# Patient Record
Sex: MALE | Race: WHITE
[De-identification: names, ages, dates, MRNs, and addresses within clinical notes are randomized per-mention and may not be internally consistent; named-entity substitution may affect disease eponyms.]

---

## 2019-02-15 ENCOUNTER — HOSPITAL ENCOUNTER (EMERGENCY)
Dept: HOSPITAL 41 - JD.ED | Age: 49
Discharge: HOME | End: 2019-02-15
Payer: COMMERCIAL

## 2019-02-15 DIAGNOSIS — Z79.82: ICD-10-CM

## 2019-02-15 DIAGNOSIS — K52.9: Primary | ICD-10-CM

## 2019-02-15 PROCEDURE — 86645 CMV ANTIBODY IGM: CPT

## 2019-02-15 PROCEDURE — 96375 TX/PRO/DX INJ NEW DRUG ADDON: CPT

## 2019-02-15 PROCEDURE — 99285 EMERGENCY DEPT VISIT HI MDM: CPT

## 2019-02-15 PROCEDURE — 87804 INFLUENZA ASSAY W/OPTIC: CPT

## 2019-02-15 PROCEDURE — 87040 BLOOD CULTURE FOR BACTERIA: CPT

## 2019-02-15 PROCEDURE — 81001 URINALYSIS AUTO W/SCOPE: CPT

## 2019-02-15 PROCEDURE — 86644 CMV ANTIBODY: CPT

## 2019-02-15 PROCEDURE — 85027 COMPLETE CBC AUTOMATED: CPT

## 2019-02-15 PROCEDURE — 96361 HYDRATE IV INFUSION ADD-ON: CPT

## 2019-02-15 PROCEDURE — 80053 COMPREHEN METABOLIC PANEL: CPT

## 2019-02-15 PROCEDURE — 83690 ASSAY OF LIPASE: CPT

## 2019-02-15 PROCEDURE — 36415 COLL VENOUS BLD VENIPUNCTURE: CPT

## 2019-02-15 PROCEDURE — 74177 CT ABD & PELVIS W/CONTRAST: CPT

## 2019-02-15 PROCEDURE — 96365 THER/PROPH/DIAG IV INF INIT: CPT

## 2019-02-15 PROCEDURE — 96376 TX/PRO/DX INJ SAME DRUG ADON: CPT

## 2019-02-15 PROCEDURE — 85007 BL SMEAR W/DIFF WBC COUNT: CPT

## 2019-02-15 NOTE — EDM.PDOC
<Kelton Lees - Last Filed: 02/15/19 18:03>





ED HPI GENERAL MEDICAL PROBLEM





- General


Chief Complaint: Abdominal Pain


Stated Complaint: [facility]


Time Seen by Provider: 02/15/19 03:01


Source of Information: Reports: Patient, RN Notes Reviewed


History Limitations: Reports: No Limitations





- History of Present Illness


INITIAL COMMENTS - FREE TEXT/NARRATIVE: 





The patient states that he developed generalized abdominal pain, nausea, 

vomiting, and watery diarrhea around 21:00 last night, Thursday, 2/14/2019. He 

describes his abdominal pain as crampy and stabbing in character. It is 

constant. He has not identified any modifiers. He reports that he had a 

subjective fever earlier yesterday, and some dysuria earlier last night. He 

does not recall eating any bad or spoiled food recently. None of his contacts 

at home or work are similarly ill, to the patient's knowledge. The recent 

antibiotics. No recent international travel.





The patient states that he has had similar symptoms the past, but cannot 

remember when - it was likely years ago.





No recent cough.





The patient states that he did not take any over-the-counter home remedies to 

try to treat his symptoms.





His last oral solid food was around 19:00 last night.





The patient states that he is status post a liver transplant, for NAFLD, in May 

2018, at [facility], in [place], [place]. He is on tacrolimus and 

CellCept.





The patient lives in [place], [place], which is where his PCP is. 

He is in this area for work. He works for 14 days, then returns home for 7 days

, then repeats.








  ** Abdomen


Pain Score (Numeric/FACES): 8





- Related Data


 Allergies











Allergy/AdvReac Type Severity Reaction Status Date / Time


 


No Known Allergies Allergy   Verified 02/15/19 02:54











Home Meds: 


 Home Meds





Aspirin [Halfprin] 81 mg PO DAILY 02/15/19 [History]


Calcium Carb & Citrate/Vit D3 [Citracal + D ER] 1 tab PO BID 02/15/19 [History]


Ciprofloxacin HCl [Cipro] 500 mg PO BID #12 tablet 02/15/19 [Rx]


Magnesium Oxide [Magnesium] 400 mg PO BID 02/15/19 [History]


Metoclopramide HCl [Reglan] 10 mg PO Q6H #10 tablet 02/15/19 [Rx]


Mycophenolate Mofetil [Cellcept] 750 mg PO BID 02/15/19 [History]


Ondansetron [Zofran] 4 mg BUCCAL Q6H PRN #10 tab 02/15/19 [Rx]


Tacrolimus 1 g PO BID 02/15/19 [History]


oxyCODONE HCl [Roxicodone] 1 - 2 tab PO Q4H PRN #20 tablet 02/15/19 [Rx]











Past Medical History


Gastrointestinal History: Reports: Other (See Below) (Nonalcoholic fatty liver 

disease (NAFLD))





- Past Surgical History


GI Surgical History: Reports: Appendectomy, Hernia, Inguinal (bilateral), Other 

(See Below) (Liver transplant, May 2018, at [facility])





Social & Family History





- Family History


Family Medical History: Noncontributory





- Tobacco Use


Smoking Status *Q: Never Smoker


Tobacco Use Within Last Twelve Months: Cigars (on occasion)





- Alcohol Use


Alcohol Use History: No





- Recreational Drug Use


Recreational Drug Use: No





- Living Situation & Occupation


Living situation: Reports: , with Spouse, with Family (2 kids)


Occupation: Employed ()





ED ROS GENERAL





- Review of Systems


Review Of Systems: ROS reveals no pertinent complaints other than HPI.





ED EXAM, GI/ABD





- Physical Exam


Exam: See Below


Exam Limited By: No Limitations


General Appearance: Alert, WD/WN, Mild Distress (appears uncomfortable)


Eyes: Bilateral: Normal Appearance, EOMI


Ears: Normal External Exam, Hearing Grossly Normal


Nose: Normal Inspection


Throat/Mouth: Normal Inspection, Normal Lips, Normal Voice, No Airway Compromise


Head: Atraumatic, Normocephalic


Neck: Normal Inspection, Full Range of Motion


Respiratory/Chest: No Respiratory Distress, Lungs Clear, Normal Breath Sounds, 

No Accessory Muscle Use


Cardiovascular: Normal Peripheral Pulses, Regular Rate, Rhythm, No Edema, No 

Gallop, No JVD, No Murmur, No Rub


GI/Abdominal Exam: Soft, No Organomegaly, No Distention, No Abnormal Bruit, No 

Mass, Tender (generalized, non-focal), Abnormal Bowel Sounds (diminished), 

Other (Obese)


 (Male) Exam: Deferred


Rectal (Males) Exam: Deferred


Back Exam: Normal Inspection, Full Range of Motion, CVA Tenderness (L).  No: 

CVA Tenderness (R)


Extremities: Normal Inspection, Normal Range of Motion, No Pedal Edema, Normal 

Capillary Refill


Neurological: Alert, Oriented, Normal Cognition, No Motor/Sensory Deficits


Psychiatric: Normal Affect


Skin Exam: Warm, Dry, Intact, Normal Color, No Rash





Course





- Vital Signs


Last Recorded V/S: 


 Last Vital Signs











Temp  36.5 C   02/15/19 02:56


 


Pulse  80   02/15/19 02:56


 


Resp  18   02/15/19 02:56


 


BP  141/88 H  02/15/19 02:56


 


Pulse Ox  99   02/15/19 02:56














- Orders/Labs/Meds


Orders: 


 Active Orders 24 hr











 Category Date Time Status


 


 CULTURE BLOOD [BC] Stat Lab  02/15/19 06:45 Results


 


 CULTURE BLOOD [BC] Stat Lab  02/15/19 07:00 Results











Labs: 


 Laboratory Tests











  02/15/19 02/15/19 02/15/19 Range/Units





  03:22 03:22 04:59 


 


WBC  5.50    (4.23-9.07)  K/mm3


 


RBC  5.94    (4.63-6.08)  M/mm3


 


Hgb  16.7    (13.7-17.5)  gm/L


 


Hct  48.7    (40.1-51.0)  %


 


MCV  82.0    (79.0-92.2)  fl


 


MCH  28.1    (25.7-32.2)  pg


 


MCHC  34.3    (32.2-35.5)  g/dl


 


RDW Std Deviation  49.0 H    (35.1-43.9)  fL


 


Plt Count  95 L    (163-337)  K/mm3


 


MPV  10.3    (9.4-12.3)  fl


 


Neutrophils % (Manual)  75 H    (40-60)  %


 


Band Neutrophils %  13 H    (0-10)  %


 


Lymphocytes % (Manual)  4 L    (20-40)  %


 


Atypical Lymphs %  0    %


 


Monocytes % (Manual)  7    (2-10)  %


 


Eosinophils % (Manual)  1    (0.8-7.0)  %


 


Basophils % (Manual)  0 L    (0.2-1.2)  


 


Toxic Granulation  1+ slight    


 


Platelet Estimate  Decreased    


 


Plt Morphology Comment  Normal    


 


RBC Morph Comment  Normal    


 


Sodium   141   (136-145)  mEq/L


 


Potassium   3.9   (3.5-5.1)  mEq/L


 


Chloride   105   ()  mEq/L


 


Carbon Dioxide   26   (21-32)  mEq/L


 


Anion Gap   13.9   (5-15)  


 


BUN   25 H   (7-18)  mg/dL


 


Creatinine   1.3   (0.7-1.3)  mg/dL


 


Est Cr Clr Drug Dosing   TNP   


 


Estimated GFR (MDRD)   58   (>60)  mL/min


 


BUN/Creatinine Ratio   19.2 H   (14-18)  


 


Glucose   151 H   ()  mg/dL


 


Calcium   8.8   (8.5-10.1)  mg/dL


 


Total Bilirubin   1.7 H   (0.2-1.0)  mg/dL


 


AST   47 H   (15-37)  U/L


 


ALT   66 H   (16-63)  U/L


 


Alkaline Phosphatase   65   ()  U/L


 


Total Protein   7.0   (6.4-8.2)  g/dl


 


Albumin   3.9   (3.4-5.0)  g/dl


 


Globulin   3.1   gm/dL


 


Albumin/Globulin Ratio   1.3   (1-2)  


 


Lipase   94   ()  U/L


 


Urine Color    Rosamaria H  (Yellow)  


 


Urine Appearance    Slt cloudy H  (Clear)  


 


Urine pH    7.5  (5.0-8.0)  


 


Ur Specific Gravity    1.020  (1.005-1.030)  


 


Urine Protein    1+ H  (Negative)  


 


Urine Glucose (UA)    Negative  (Negative)  


 


Urine Ketones    Negative  (Negative)  


 


Urine Occult Blood    Trace-intact H  (Negative)  


 


Urine Nitrite    Negative  (Negative)  


 


Urine Bilirubin    1+ H  (Negative)  


 


Urine Urobilinogen    0.2  (0.2-1.0)  


 


Ur Leukocyte Esterase    Negative  (Negative)  


 


Urine RBC    0-5  (0-5)  /hpf


 


Urine WBC    Not seen  (0-5)  /hpf


 


Ur Epithelial Cells    0-5  (0-5)  /hpf


 


Urine Bacteria    Few  (FEW)  /hpf


 


Hyaline Casts    0-5  (0-5)  /lpf


 


Urine Mucus    Many H  (FEW)  /hpf











Meds: 


Medications














Discontinued Medications














Generic Name Dose Route Start Last Admin





  Trade Name Freq  PRN Reason Stop Dose Admin


 


Hydromorphone HCl  1 mg  02/15/19 03:15  02/15/19 03:26





  Dilaudid  IVPUSH  02/15/19 03:16  1 mg





  ONETIME STA   Administration





     





     





     





     


 


Hydromorphone HCl  1 mg  02/15/19 06:44  02/15/19 06:51





  Dilaudid  IVPUSH  02/15/19 06:45  1 mg





  ONETIME ONE   Administration





     





     





     





     


 


Sodium Chloride  1,000 mls @ 150 mls/hr  02/15/19 03:15  02/15/19 03:26





  Normal Saline  IV   150 mls/hr





  ASDIRECTED MOOSE   Administration





     





     





     





     


 


Piperacillin Sod/Tazobactam  100 mls @ 200 mls/hr  02/15/19 08:00  02/15/19 08:

15





  Sod 4.5 gm/ Sodium Chloride  IV   200 mls/hr





  Q6H MOOSE   Administration





     





     





     





     


 


Sodium Chloride  Confirm  02/15/19 08:09  02/15/19 08:16





  Normal Saline  Administered  02/15/19 08:10  Not Given





  Dose   





  100 mls @ as directed   





  .ROUTE   





  .STK-MED ONE   





     





     





     





     


 


Metoclopramide HCl  10 mg  02/15/19 05:30  02/15/19 08:16





  Reglan  IVPUSH  02/15/19 05:31  Not Given





  ONETIME STA   





     





     





     





     


 


Metoclopramide HCl  Confirm  02/15/19 05:32  02/15/19 05:54





  Reglan  Administered  02/15/19 05:33  Not Given





  Dose   





  10 mg   





  .ROUTE   





  .STK-MED ONE   





     





     





     





     


 


Ondansetron HCl  4 mg  02/15/19 03:15  02/15/19 03:26





  Zofran  IVPUSH  02/15/19 03:16  4 mg





  ONETIME ONE   Administration





     





     





     





     


 


Ondansetron HCl  Confirm  02/15/19 05:31  02/15/19 05:54





  Zofran  Administered  02/15/19 05:32  Not Given





  Dose   





  4 mg   





  .ROUTE   





  .STK-MED ONE   





     





     





     





     














- Re-Assessments/Exams


Free Text/Narrative Re-Assessment/Exam: 





02/15/19 03:25


The cause of the patient's generalized abdominal pain, nausea, vomiting, and 

watery diarrhea is not immediately clear. He has considerable non-focal 

tenderness, and diminished bowel sounds. In addition, he has some left CVA 

tenderness and a history of dysuria earlier tonight. I have ordered a workup 

that included blood work, a urinalysis, and a CT scan of his abdomen and pelvis 

with oral and IV contrast. In the meantime, the patient will receive IV Dilaudid

, IV Zofran, and IV fluid.








02/15/19 05:54


CT of the abdomen and pelvis with oral and IV contrast is read by [facility] as:


1. Status post liver transplant surgery


2. Normal appearing hepatic artery, portal vein as well as biliary anastomosis


3. Mild michael hepatis edema is noted. The significance is unclear








02/15/19 06:10


Case discussed with Dr. Sarah Sánchez at 05:58. Since the patient's liver 

transplant was less than one year ago, she is concerned that his symptoms may 

represent early rejection.





Case then discussed with a representative at the [facility] 

liver transplant facility at 06:00. She will have the Hepatologist on-call, Dr. Mayes, call us back as soon as possible.





The above was discussed with the patient. Dr. Mayes is his Hepatologist. He is 

prepared to transfer back to [place], if that is determined what is 

necessary.








02/15/19 06:20


Case discussed with Dr. Mayes, Hepatologist at [facility], 

at 06:13. She is concerned that the patient may have CMV infection, which would 

account for his report of fever and watery diarrhea. She recommended that we 

send for a blood quantitative CMV by PCR. She is aware that it will probably 

take 48 hours to get the results. She would also like a stool C. difficile by 

PCR, and blood cultures. She would like us to keep the patient well hydrated. 

She is going to check with the patient's coordinator, to see if a recent 

evaluation for CMV is positive or negative, and will plan to call us back in 

about one hour. She is concerned, based on the patient's presentation, that he 

will need to be transferred back to their facility, but will make that 

determination when she calls us back. I notified her that we will have had a 

change of shift by the time she calls back, and that she will be discussing the 

case with Dr. Gamble.








02/15/19 06:23


I checked with the lab. A CMV test sent today will not likely return until 

Monday, 2/18/2019.





The patient has not been able to provide a stool sample for us thus far.








02/15/19 07:00


Case discussed with Dr. Cesario Gamble, and care of the patient turned over to 

him at this time, for change of shift.





Departure





- Departure


Time of Disposition: 09:00


Disposition: Home, Self-Care 01


Clinical Impression: 


 Enteritis








- Discharge Information


Prescriptions: 


Ciprofloxacin HCl [Cipro] 500 mg PO BID #12 tablet


Metoclopramide HCl [Reglan] 10 mg PO Q6H #10 tablet


Ondansetron [Zofran] 4 mg BUCCAL Q6H PRN #10 tab


 PRN Reason: nausea or vomiting


oxyCODONE HCl [Roxicodone] 1 - 2 tab PO Q4H PRN #20 tablet


 PRN Reason: Abdominal Pain


Instructions:  Food Choices to Help Relieve Diarrhea, Adult, Nausea and Vomiting

, Adult, Easy-to-Read


Referrals: 


PCP,Not In Area [Primary Care Provider] - 


Forms:  ED Department Discharge


Additional Instructions: 


Evaluation the emergency room today in regards to sudden onset of the 

gastroenteritis illness with nausea vomiting and diffuse abdominal pain 

associated with diarrhea. Concern is whether or not this is related to 

cytomegalovirus infection that you are prone to acting out due to liver 

transplant and the current immunosuppressants that you are on. The possibility 

of foodborne illness to cause diarrhea is certainly possible viral 

gastroenteritis is more likely. We are not able to get a cytomegalovirus 

reading until Monday this week. In the ED were treated with intravenous fluids 

and did receive a dose of intravenous antibiotic Zosyn. Treatment is suggested 

to be clear fluids such as Gatorade/Powerade 5-6 ounces sipped per hour. When 

hungry try soda crackers. Jell-O would be okay at any time. If hungrier later 

tonight try broth soup. May also try GM on toast or bread. Medications to be 

Reglan 10 mg every 6-8 hours necessary for relief of nausea vomiting. 

Roxicodone 5 mg strength one or 2 every 4-6 hours necessary for relief of 

abdominal cramping pain. Antibiotic to be Cipro 500 mg twice daily for the next 

6 days to clear up any bacterial associated diarrhea. As discussed the plan is 

for you to head back to [place] you're home where he will be close to 

[facility] and your liver transplant team. The goal of course is to get 

you back home when you can arrange appropriate flights. Of note influenza 

screen here in the hospital was negative as well. Copies of your chart and CT 

report will be provided to you.





<Cesario Gamble - Last Filed: 02/16/19 07:24>





Course





- Re-Assessments/Exams


Free Text/Narrative Re-Assessment/Exam: 





02/15/19 07:57  I have just spoke with Dr. Carlyn Mayes hepatologist at [facility] in [place]. She feels that Mr. Albrecht will require admission 

to hospital for IV fluids and should be started on antibiotic for possible 

bacterial-induced antritis since his abdomen is so sore. She she suggest good 

gram-negative coverage and therefore he will be receive Zosyn 4.5 mg IV. She 

believes that he ideally needs to be admitted for fluids and monitoring and 

assessment for cytomegalovirus infection. He was due for lab work today in this 

regard. I will discuss options with the patient as to transfer to the nearest 

facility that we'll be able to look after him in regards to his transplant 

care. He should also mentioned to Dr. Johnson that he has a low-grade fever of 100

 with a cough as well. Days ago. She requests influenza screen of course be 

carried out which  will be done. Nausea is better and he is requesting a few 

crackers. He does have diffuse left lower quadrant abdominal tenderness still. 

The plan will be to try and get him on a commercial flight back to [place] 

[place] today.





02/15/19 08:56  Influenza screen is negative. Patient believes that it'll take 

a day or so before he can make appropriate flight arrangements to get back to 

[place]. His wife will be flying up today from [place]. Therefore 

tentatively will place him on Zofran 4 mg sublingually every 4-6 hours needed 

for nausea vomiting relief. Suggest Cipro 500 mg twice a day KCl picked up a 

bacterial enteritis. He will have remain on clear fluid diet. Roxicodone 

tablets one or 2 every 4-6 hours needed for pain relief.





02/15/19 09:00 patient is completed his IV Zosyn 4.5 g IV. Plan will be to 

discharge him on Roxicodone tablets he has an apartment here in town he 

believes he can function in his apartment until he can arrange for appropriate 

flight home.














Departure





- Departure


Condition: Fair





- Discharge Information


*PRESCRIPTION DRUG MONITORING PROGRAM REVIEWED*: Not Applicable


*COPY OF PRESCRIPTION DRUG MONITORING REPORT IN PATIENT DANIELA: Not Applicable

## 2019-02-15 NOTE — CT
CT abdomen and pelvis

 

Technique: Multiple axial sections were obtained from above the dome 

of the diaphragm inferiorly through the pubic symphysis.  Intravenous 

and oral contrast was utilized.  Delayed images also obtained through 

the abdomen and pelvis.

 

Small portion of the visualized lung bases show a small subpleural 

nodule within the left base within the lingula measuring approximately

 5 mm.  This appears calcified compatible with granuloma.  No acute 

abnormality is seen within the lung bases.

 

Spleen is enlarged.  Liver shows no focal parenchymal abnormality.  

Several surgical clips are seen around the junction of the inferior 

vena cava into the intrahepatic portion.  Normal opacification of the 

portal veins are noted.  Superior mesenteric vein appears patent into 

the liver.  Haziness is noted within the michael hepatis possibly due to

 change from previous surgery.

 

Adrenal glands show no nodule.  Kidneys show symmetric contrast 

enhancement.  Small nonobstructing stone noted within the lower left 

kidney.  Delayed images show contrast excretion into both ureters and 

bladder.

 

Liver shows no focal abnormality.  Aorta shows no aneurysm.  Surgical 

clips seen presumably from previous appendectomy.  No pelvic mass or 

adenopathy is seen.  Small fat-containing left inguinal hernia is 

noted.  No free fluid or other inflammatory change is seen.

 

Bone window settings were reviewed which show degenerative change 

within the spine most severe at L5-S1.

 

Impression:

1.  Previous liver transplant.  Mild haziness within the michael hepatis

 most likely residual from previous surgery.  Vascular enhancement of 

the liver appears within normal limits.

2.  Splenomegaly.

3.  Small nonobstructing calculus within the lower left kidney.

4.  Other incidental findings.  Nothing acute is appreciated.

 

Diagnostic code #3

 

Agree with preliminary report issued by [facility], 

preliminary report finalized on 02/15/19, 6:50 AM Central Time